# Patient Record
Sex: FEMALE | Race: WHITE | NOT HISPANIC OR LATINO | URBAN - METROPOLITAN AREA
[De-identification: names, ages, dates, MRNs, and addresses within clinical notes are randomized per-mention and may not be internally consistent; named-entity substitution may affect disease eponyms.]

---

## 2017-08-14 ENCOUNTER — OFFICE VISIT - HEALTHEAST (OUTPATIENT)
Dept: GERIATRICS | Facility: CLINIC | Age: 82
End: 2017-08-14

## 2017-08-14 DIAGNOSIS — N17.9 ARF (ACUTE RENAL FAILURE) (H): ICD-10-CM

## 2017-08-14 DIAGNOSIS — E78.5 HYPERLIPIDEMIA: ICD-10-CM

## 2017-08-14 DIAGNOSIS — I10 HYPERTENSION: ICD-10-CM

## 2017-08-14 DIAGNOSIS — M81.0 OSTEOPOROSIS: ICD-10-CM

## 2017-08-16 ENCOUNTER — OFFICE VISIT - HEALTHEAST (OUTPATIENT)
Dept: GERIATRICS | Facility: CLINIC | Age: 82
End: 2017-08-16

## 2017-08-16 DIAGNOSIS — R62.7 FAILURE TO THRIVE IN ADULT: ICD-10-CM

## 2017-08-16 DIAGNOSIS — E78.2 MIXED HYPERLIPIDEMIA: ICD-10-CM

## 2017-08-16 DIAGNOSIS — I95.9 HYPOTENSION: ICD-10-CM

## 2017-08-16 DIAGNOSIS — M81.0 OSTEOPOROSIS: ICD-10-CM

## 2017-08-21 ENCOUNTER — AMBULATORY - HEALTHEAST (OUTPATIENT)
Dept: ADMINISTRATIVE | Facility: CLINIC | Age: 82
End: 2017-08-21

## 2017-08-21 RX ORDER — ATORVASTATIN CALCIUM 40 MG/1
80 TABLET, FILM COATED ORAL DAILY
Status: SHIPPED | COMMUNITY
Start: 2017-04-07

## 2017-08-21 RX ORDER — PANTOPRAZOLE SODIUM 40 MG/1
40 TABLET, DELAYED RELEASE ORAL DAILY
Status: SHIPPED | COMMUNITY
Start: 2017-08-10

## 2017-08-21 RX ORDER — ACETAMINOPHEN 325 MG/1
650 TABLET ORAL EVERY 4 HOURS PRN
Status: SHIPPED | COMMUNITY
Start: 2017-08-10

## 2017-08-21 RX ORDER — ALENDRONATE SODIUM 70 MG/1
1 TABLET ORAL WEEKLY
Status: SHIPPED | COMMUNITY
Start: 2017-04-07

## 2017-08-21 RX ORDER — DOCUSATE SODIUM 100 MG/1
100 CAPSULE, LIQUID FILLED ORAL DAILY PRN
Status: SHIPPED | COMMUNITY
Start: 2017-08-10

## 2017-08-21 RX ORDER — BRIMONIDINE TARTRATE 1 MG/ML
1 SOLUTION/ DROPS OPHTHALMIC EVERY 8 HOURS
Status: SHIPPED | COMMUNITY
Start: 2016-12-14

## 2017-08-21 RX ORDER — DORZOLAMIDE HYDROCHLORIDE AND TIMOLOL MALEATE 20; 5 MG/ML; MG/ML
1 SOLUTION/ DROPS OPHTHALMIC DAILY
Status: SHIPPED | COMMUNITY
Start: 2016-11-29

## 2017-08-22 ENCOUNTER — OFFICE VISIT - HEALTHEAST (OUTPATIENT)
Dept: GERIATRICS | Facility: CLINIC | Age: 82
End: 2017-08-22

## 2017-08-22 DIAGNOSIS — E87.6 POTASSIUM DEFICIENCY: ICD-10-CM

## 2017-08-22 DIAGNOSIS — E61.2 MAGNESIUM DEFICIENCY: ICD-10-CM

## 2017-08-22 DIAGNOSIS — R62.7 FTT (FAILURE TO THRIVE) IN ADULT: ICD-10-CM

## 2017-08-22 DIAGNOSIS — E78.2 MIXED HYPERLIPIDEMIA: ICD-10-CM

## 2017-08-22 DIAGNOSIS — M81.0 OSTEOPOROSIS: ICD-10-CM

## 2017-08-22 DIAGNOSIS — I95.9 HYPOTENSION: ICD-10-CM

## 2017-08-28 ENCOUNTER — OFFICE VISIT - HEALTHEAST (OUTPATIENT)
Dept: GERIATRICS | Facility: CLINIC | Age: 82
End: 2017-08-28

## 2017-08-28 DIAGNOSIS — R62.7 FTT (FAILURE TO THRIVE) IN ADULT: ICD-10-CM

## 2017-08-28 DIAGNOSIS — E87.6 POTASSIUM DEFICIENCY: ICD-10-CM

## 2017-08-28 DIAGNOSIS — E61.2 MAGNESIUM DEFICIENCY: ICD-10-CM

## 2017-08-28 DIAGNOSIS — M81.0 OSTEOPOROSIS: ICD-10-CM

## 2017-08-29 ENCOUNTER — OFFICE VISIT - HEALTHEAST (OUTPATIENT)
Dept: GERIATRICS | Facility: CLINIC | Age: 82
End: 2017-08-29

## 2017-08-29 DIAGNOSIS — E78.2 MIXED HYPERLIPIDEMIA: ICD-10-CM

## 2017-08-29 DIAGNOSIS — I95.9 HYPOTENSION: ICD-10-CM

## 2017-08-29 DIAGNOSIS — E87.6 POTASSIUM DEFICIENCY: ICD-10-CM

## 2017-08-29 DIAGNOSIS — E61.2 MAGNESIUM DEFICIENCY: ICD-10-CM

## 2017-09-08 ENCOUNTER — OFFICE VISIT - HEALTHEAST (OUTPATIENT)
Dept: GERIATRICS | Facility: CLINIC | Age: 82
End: 2017-09-08

## 2017-09-08 DIAGNOSIS — I95.9 HYPOTENSION: ICD-10-CM

## 2017-09-08 DIAGNOSIS — E87.6 POTASSIUM DEFICIENCY: ICD-10-CM

## 2017-09-08 DIAGNOSIS — R62.7 FTT (FAILURE TO THRIVE) IN ADULT: ICD-10-CM

## 2017-09-08 DIAGNOSIS — E61.2 MAGNESIUM DEFICIENCY: ICD-10-CM

## 2017-09-11 ENCOUNTER — OFFICE VISIT - HEALTHEAST (OUTPATIENT)
Dept: GERIATRICS | Facility: CLINIC | Age: 82
End: 2017-09-11

## 2017-09-11 DIAGNOSIS — I95.9 HYPOTENSION: ICD-10-CM

## 2017-09-11 DIAGNOSIS — R62.7 FTT (FAILURE TO THRIVE) IN ADULT: ICD-10-CM

## 2017-09-11 DIAGNOSIS — M81.0 OSTEOPOROSIS: ICD-10-CM

## 2017-09-11 DIAGNOSIS — K59.01 SLOW TRANSIT CONSTIPATION: ICD-10-CM

## 2017-09-20 ENCOUNTER — OFFICE VISIT - HEALTHEAST (OUTPATIENT)
Dept: GERIATRICS | Facility: CLINIC | Age: 82
End: 2017-09-20

## 2017-09-20 DIAGNOSIS — E78.5 HYPERLIPIDEMIA: ICD-10-CM

## 2017-09-20 DIAGNOSIS — N17.9 ARF (ACUTE RENAL FAILURE) (H): ICD-10-CM

## 2017-09-20 DIAGNOSIS — K59.01 SLOW TRANSIT CONSTIPATION: ICD-10-CM

## 2017-09-20 DIAGNOSIS — I95.9 HYPOTENSION: ICD-10-CM

## 2017-09-20 DIAGNOSIS — E87.6 POTASSIUM DEFICIENCY: ICD-10-CM

## 2017-09-20 DIAGNOSIS — E61.2 MAGNESIUM DEFICIENCY: ICD-10-CM

## 2017-09-20 DIAGNOSIS — E78.2 MIXED HYPERLIPIDEMIA: ICD-10-CM

## 2017-09-20 DIAGNOSIS — R62.7 FTT (FAILURE TO THRIVE) IN ADULT: ICD-10-CM

## 2017-09-20 DIAGNOSIS — M81.0 OSTEOPOROSIS: ICD-10-CM

## 2017-09-21 ENCOUNTER — AMBULATORY - HEALTHEAST (OUTPATIENT)
Dept: GERIATRICS | Facility: CLINIC | Age: 82
End: 2017-09-21

## 2020-09-22 ENCOUNTER — RECORDS - HEALTHEAST (OUTPATIENT)
Dept: LAB | Facility: CLINIC | Age: 85
End: 2020-09-22

## 2020-09-23 LAB
ANION GAP SERPL CALCULATED.3IONS-SCNC: 9 MMOL/L (ref 5–18)
BUN SERPL-MCNC: 25 MG/DL (ref 8–28)
CALCIUM SERPL-MCNC: 9.3 MG/DL (ref 8.5–10.5)
CHLORIDE BLD-SCNC: 110 MMOL/L (ref 98–107)
CO2 SERPL-SCNC: 24 MMOL/L (ref 22–31)
CREAT SERPL-MCNC: 1.26 MG/DL (ref 0.6–1.1)
GFR SERPL CREATININE-BSD FRML MDRD: 40 ML/MIN/1.73M2
GLUCOSE BLD-MCNC: 154 MG/DL (ref 70–125)
MAGNESIUM SERPL-MCNC: 2 MG/DL (ref 1.8–2.6)
POTASSIUM BLD-SCNC: 3.8 MMOL/L (ref 3.5–5)
SODIUM SERPL-SCNC: 143 MMOL/L (ref 136–145)

## 2021-05-31 VITALS — WEIGHT: 125.4 LBS

## 2021-05-31 VITALS — WEIGHT: 125 LBS

## 2021-05-31 VITALS — WEIGHT: 125.6 LBS

## 2021-05-31 VITALS — WEIGHT: 125.8 LBS

## 2021-06-12 NOTE — PROGRESS NOTES
[facility]      Facility:    ANSWER ROOMING ACTIVITY QUESTION  Code Status: FULL CODE      Chief Complaint/Reason for Visit:  No chief complaint on file.      HPI:   Kayla is a 82 y.o. female who is currently admitted to the TCU as a transfer from [facility]. She has a past medical history of a cerebellar disorder, hypertension, hyperlipidemia, and osteoporosis who lives by herself in her own home in [place].  Apparently she had been not been doing well due to an underlying history of balance issues.  She had frequent falls as well.  She was admitted to the hospital with increased dizziness and was also noted to have renal insufficiency with a creatinine of 2.06 with a baseline of 1.5.  She is felt to be dehydrated and fluid resuscitated, which improved her kidney function.  She was also noted to be hypotensive so her losartan and her chlorthalidone were discontinued.  It was felt that her declining oral intake and poor self-care ability contributed to her low blood pressures hence she will need alternative arrangements for long-term housing.  Family is currently looking for placement preferably in assisted living in the meantime she will be participating in therapy to maintain strength and cognitive testing.    TCU:  Today I find an 82-year-old female lying in bed with limited complaints.  She denied any issues, is participating in therapy, and just waiting for long-term placement.  As well no issues today.  Potassium and magnesium stable after supplementation.  Requested wheelchair previously (8/28). BIMS 15/15.    Patient denies pain, headache, chest pain, numbness or tingling, shortest of breath, eating or swallowing concerns, nausea or vomiting, diarrhea or bowel abnormalities, or no new integumentary concerns today.    Past Medical History:  Past Medical History:   Diagnosis Date     GARLAND (acute kidney injury)      Cerebellar dysfunction      Esophageal reflux       Glaucoma      HLD (hyperlipidemia)      HTN (hypertension)      Osteoporosis            Surgical History:  Past Surgical History:   Procedure Laterality Date     CATARACT EXTRACTION Right 02/11/2013     EYE SURGERY Bilateral     laser surgery of eye; right 5/15/12, left 9/12/12     TONSILLECTOMY       VAGINAL DELIVERY      x2       Family History:   Family History   Problem Relation Age of Onset     Heart disease Mother      MI, CABG     Hypertension Mother      Arthritis Mother      Heart disease Father      pacemaker     Cancer Father      Hypertension Sister      Prostate cancer Brother      Breast cancer Sister      Hypertension Brother        Social History:    Social History     Social History     Marital status:      Spouse name: N/A     Number of children: N/A     Years of education: N/A     Social History Main Topics     Smoking status: Never Smoker     Smokeless tobacco: Never Used     Alcohol use No     Drug use: Not on file     Sexual activity: Not on file     Other Topics Concern     Not on file     Social History Narrative     No narrative on file          Review of Systems   Constitutional: Negative for activity change, appetite change and fatigue.        No acute concerns   HENT: Negative for congestion and facial swelling.    Eyes: Negative for photophobia and visual disturbance.   Respiratory: Negative for apnea, shortness of breath and wheezing.    Cardiovascular: Negative for chest pain.   Gastrointestinal: Negative for abdominal distention, abdominal pain, blood in stool and constipation.   Endocrine: Negative.    Genitourinary: Negative for dysuria and frequency.   Skin: Negative.    Allergic/Immunologic: Negative.    Neurological: Positive for dizziness. Negative for tremors, speech difficulty, weakness and headaches.        Chronic vertigo   Hematological: Negative.    Psychiatric/Behavioral: Negative for agitation and confusion.       Vitals:    09/10/17 1449   BP: 151/67   Pulse: 76    Resp: 20   Temp: 97.5  F (36.4  C)   SpO2: 94%   Weight: 125 lb 6.4 oz (56.9 kg)       Physical Exam   Constitutional: She is oriented to person, place, and time. No distress.   No acute concerns   HENT:   Head: Normocephalic and atraumatic.   Mouth/Throat: Oropharynx is clear and moist.   Eyes: Right eye exhibits no discharge. Left eye exhibits no discharge.   Neck: Normal range of motion.   Cardiovascular: Normal rate and regular rhythm.  Exam reveals no gallop and no friction rub.    No murmur heard.  Pulmonary/Chest: Effort normal and breath sounds normal. She has no rales.   clear   Abdominal: Soft. Bowel sounds are normal. She exhibits no distension. There is no tenderness.   Genitourinary:   Genitourinary Comments: deferred   Musculoskeletal: She exhibits no edema, tenderness or deformity.   Neurological: She is oriented to person, place, and time.   Chronic vertigo, dizziness   Skin: Skin is warm and dry. She is not diaphoretic.   intact   Psychiatric: She has a normal mood and affect.   No depression or anxiety       Medication List:  Current Outpatient Prescriptions   Medication Sig     acetaminophen (TYLENOL) 325 MG tablet Take 650 mg by mouth every 4 (four) hours as needed.     alendronate (FOSAMAX) 70 MG tablet Take 1 tablet by mouth once a week. Every Thu     atorvastatin (LIPITOR) 40 MG tablet Take 80 mg by mouth daily.     bimatoprost (LUMIGAN) 0.01 % Drop Administer 1 drop to both eyes daily.     brimonidine (ALPHAGAN P) 0.1 % Drop Administer 1 drop to both eyes every 8 (eight) hours.     docusate sodium (COLACE) 100 MG capsule Take 100 mg by mouth daily as needed.     dorzolamide-timolol (COSOPT) 22.3-6.8 mg/mL ophthalmic solution Administer 1 drop to both eyes daily.     magnesium oxide 250 mg Tab Take 250 mg by mouth daily.     pantoprazole (PROTONIX) 40 MG tablet Take 40 mg by mouth daily.     potassium chloride (KLOR-CON) 10 MEQ CR tablet Take 10 mEq by mouth daily.       Labs:  No results  found for this or any previous visit (from the past 168 hour(s)).    Assessment:    ICD-10-CM    1. Hypotension I95.9    2. Potassium deficiency E87.6    3. Magnesium deficiency E61.2    4. FTT (failure to thrive) in adult R62.7      Plan:  1. Monitor BPs, 110-150s/60-80s  2. Last K 4.2  3. Last mag 2.0  4. Wt stable at 125lb    The care plan has been reviewed and all orders signed. Changes to care plan, if any, as noted. Otherwise, continue care plan of care.      Electronically signed by: Pedro Damon NP   Cold/Sinus

## 2021-06-12 NOTE — PROGRESS NOTES
[facility]      Code Status:  FULL CODE  Visit Type: H & P     Facility:  [facility] [[id number]]           History of Present Illness: Kayla Tanner is a 82 y.o. female who is currently admitted to the TCU as a transfer from [facility].  This is a 82-year-old with history of some cerebellar disorder along with hypertension hyperlipidemia who lives in her own home in [place].  She has not been doing very well with due to the underlying history of balance issues she falls frequently there.  She was admitted to the hospital with increasing dizziness and was also noted to have renal insufficiency with an elevated creatinine of 2.06 with a baseline of 1.5.  She was felt to be dehydrated and given IV fluids with improvement in her kidney function.  She was also noted to be hypotensive and her losartan and chlorthalidone was discontinued and her blood pressures did rebound it was felt that due to declining oral intake poor self-cares her blood pressures were low hence she was becoming more symptomatic and it was recommended that she look for an alternative arrangement  Patient agrees with above game plan and she is just awaiting family to look for an alternative placement preferably in assisted living in the meantime she is here for therapy and strengthening as per her    Past Medical History:   Diagnosis Date     GARLAND (acute kidney injury)      Cerebellar dysfunction      Esophageal reflux      Glaucoma      HLD (hyperlipidemia)      HTN (hypertension)      Osteoporosis      Past Surgical History:   Procedure Laterality Date     CATARACT EXTRACTION Right 02/11/2013     EYE SURGERY Bilateral     laser surgery of eye; right 5/15/12, left 9/12/12     TONSILLECTOMY       VAGINAL DELIVERY      x2     Family History   Problem Relation Age of Onset     Heart disease Mother      MI, CABG     Hypertension Mother      Arthritis Mother      Heart disease Father       pacemaker     Cancer Father      Hypertension Sister      Prostate cancer Brother      Breast cancer Sister      Hypertension Brother      Social History     Social History     Marital status:      Spouse name: N/A     Number of children: N/A     Years of education: N/A     Occupational History     Not on file.     Social History Main Topics     Smoking status: Never Smoker     Smokeless tobacco: Never Used     Alcohol use No     Drug use: Not on file     Sexual activity: Not on file     Other Topics Concern     Not on file     Social History Narrative     No narrative on file     Current Outpatient Prescriptions   Medication Sig Dispense Refill     acetaminophen (TYLENOL) 325 MG tablet Take 650 mg by mouth every 4 (four) hours as needed.       alendronate (FOSAMAX) 70 MG tablet Take 1 tablet by mouth once a week. Every Thu       atorvastatin (LIPITOR) 40 MG tablet Take 80 mg by mouth daily.       bimatoprost (LUMIGAN) 0.01 % Drop Administer 1 drop to both eyes daily.       brimonidine (ALPHAGAN P) 0.1 % Drop Administer 1 drop to both eyes every 8 (eight) hours.       docusate sodium (COLACE) 100 MG capsule Take 100 mg by mouth daily as needed.       dorzolamide-timolol (COSOPT) 22.3-6.8 mg/mL ophthalmic solution Administer 1 drop to both eyes daily.       pantoprazole (PROTONIX) 40 MG tablet Take 40 mg by mouth daily.       No current facility-administered medications for this visit.      Allergies   Allergen Reactions     Amlodipine Swelling         Review of Systems:    Constitutional: Negative.  Negative for fever, chills, activity change, appetite change and fatigue.   HENT: Negative for congestion and facial swelling.    Eyes: Negative for photophobia, redness and visual disturbance.   Respiratory: Negative for cough and chest tightness.    Cardiovascular: Negative for chest pain, palpitations and leg swelling.   Gastrointestinal: Negative for nausea, diarrhea, constipation, blood in stool and abdominal  distention.   Genitourinary: Negative.    Musculoskeletal: Negative.    Skin: Negative.    Neurological: Negative for dizziness, tremors, syncope, weakness, light-headedness and headaches.   Hematological: Does not bruise/bleed easily.   Psychiatric/Behavioral: Negative.      Vitals:    08/14/17 1236   BP: 108/61   Pulse: 75   Resp: 18   Temp: 98  F (36.7  C)       Physical Exam:    GENERAL: no acute distress. Cooperative in conversation.   HEENT: pupils are equal, round and reactive. Oral mucosa is moist and intact.  RESP:Chest symmetric. Regular respiratory rate. No stridor.  CVS: S1S2  ABD: Nondistended, soft.  EXTREMITIES: No lower extremity edema.   NEURO: non focal. Alert and oriented x3.   PSYCH: within normal limits. No depression or anxiety.  SKIN: warm dry intact       Labs:                    Back to top of Results      BASIC METABOLIC PANEL (08/09/2017 7:48 AM)  Only the most recent of 3 results within the time period is included.    BASIC METABOLIC PANEL (08/09/2017 7:48 AM)   Component Value Ref Range   SODIUM 144 135 - 145 mmol/L   POTASSIUM 3.8 3.5 - 5.0 mmol/L   CHLORIDE 118 (H) 98 - 110 mmol/L   CO2,TOTAL 17 (L) 21 - 31 mmol/L   ANION GAP 9 5 - 18    GLUCOSE 141 (H) 65 - 100 mg/dL   CALCIUM 8.2 (L) 8.5 - 10.5 mg/dL   BUN 26 (H) 8 - 25 mg/dL   CREATININE 1.28 (H) 0.57 - 1.11 mg/dL   BUN/CREAT RATIO  20 10 - 20    GFR if  48 (L) >60 ml/min/1.73m2   GFR if not African American 40 (L) >60 ml/min/1.73m2     BASIC METABOLIC PANEL (08/09/2017 7:48 AM)   Specimen Performing Laboratory   [facility]    SENDOUT INTERNAL ZIP [id number] [address] [place] [address]     Back to top of Results      XR CHEST 2 VIEWS PA AND LATERAL (08/07/2017 8:33 PM)  XR CHEST 2 VIEWS PA AND LATERAL (08/07/2017 8:33 PM)   Narrative   XR CHEST 2 VIEWS PA AND LATERAL    8/7/2017 8:33 PM        INDICATION: Weakness.    COMPARISON: None.        FINDINGS: Negative chest. Mild  aortic atherosclerosis.       XR CHEST 2 VIEWS PA AND LATERAL (08/07/2017 8:33 PM)   Procedure Note   Interface, Powerscribe - 08/07/2017 8:59 PM CDT    XR CHEST 2 VIEWS PA AND LATERAL  8/7/2017 8:33 PM    INDICATION: Weakness.  COMPARISON: None.    FINDINGS: Negative chest. Mild aortic atherosclerosis.     Back to top of Results      EXTRA TUBE BLUE (08/07/2017 8:19 PM)  EXTRA TUBE BLUE (08/07/2017 8:19 PM)   Specimen Performing Laboratory   [facility]    SENDOUT INTERNAL ZIP [id number]    [address] [place] [address]     Back to top of Results      NUCLEATED RED BLOOD CELLS AS PERCENT OF BLOOD LEUKOCYTES (08/07/2017 8:19 PM)  NUCLEATED RED BLOOD CELLS AS PERCENT OF BLOOD LEUKOCYTES (08/07/2017 8:19 PM)   Component Value Ref Range   NRBC  0.0 %   ABS NRBC 0.0 thou /cu mm     NUCLEATED RED BLOOD CELLS AS PERCENT OF BLOOD LEUKOCYTES (08/07/2017 8:19 PM)   Specimen Performing Laboratory   [facility]    SENDOUT INTERNAL ZIP [id number]    [address] [place] [address]       NUCLEATED RED BLOOD CELLS AS PERCENT OF BLOOD LEUKOCYTES (08/07/2017 8:19 PM)   Narrative                Back to top of Results      CBC (08/07/2017 8:19 PM)  CBC (08/07/2017 8:19 PM)   Component Value Ref Range   WHITE BLOOD COUNT  10.4 4.5 - 11.0 thou/cu mm   RED BLOOD COUNT  3.82 (L) 4.00 - 5.20 mil/cu mm   HEMOGLOBIN  11.8 (L) 12.0 - 16.0 g/dL   HEMATOCRIT  35.9 33.0 - 51.0 %   MCV  94 80 - 100 fL   MCH  30.9 26.0 - 34.0 pg   MCHC  32.9 32.0 - 36.0 g/dL   RDW  12.6 11.5 - 15.5 %   PLATELET COUNT  241 140 - 440 thou/cu mm   MPV  10.6 6.5 - 11.0 fL     CBC (08/07/2017 8:19 PM)   Specimen Performing Laboratory   [facility]    SENDOUT INTERNAL ZIP [id number]    [address] [place] [address]     Back to top of Results      HEPATIC FUNCTION PANEL (08/07/2017 8:19 PM)  HEPATIC FUNCTION PANEL (08/07/2017 8:19 PM)   Component Value Ref Range   ALBUMIN  4.0 3.2 - 4.6 g/dL   PROTEIN,TOTAL 7.4 6.0 - 8.0 g/dL   GLOBULIN  3.4 2.0 - 3.7 g/dL   A/G RATIO 1.2 1.0 - 2.0    BILIRUBIN,TOTAL 0.5 0.2 - 1.2 mg/dL   BILIRUBIN,DIRECT 0.2 0.1 - 0.5 mg/dL   BILIRUBIN,INDIRECT  0.3 0.2 - 0.8 mg/dL   ALK PHOSPHATASE 83 50 - 136 IU/L   ALT (SGPT) 16 8 - 45 IU/L   AST (SGOT) 21 2 - 40 IU/L     HEPATIC FUNCTION PANEL (08/07/2017 8:19 PM)   Specimen Performing Laboratory   [facility]    SENDOUT INTERNAL ZIP [id number]    [address] [place] [address]             Assessment/Plan:    Acute renal insufficiency felt to be secondary to dehydration with improvement in creatinine post fluid administration  Hypotension with hypertension taken off the losartan and HCTZ blood pressures so far have been low continue to monitor clinically  Cerebellar dysfunction with resultant loss of balance with frequent falls  Failure to thrive with poor self-cares family is looking at alternative placement decisions for her  GERD-she is on Protonix daily  Osteoporosis continue with her Fosamax  Hyperlipidemia continue with her Lipitor  Debilitation with history of frequent falls she is here for PT OT and rehab plan is to discharge to an alternative facility upon discharge  Total time spent was 45 minutes, more than half of it was in face-to-face counseling regarding disease state, treatment, side effects, documentation, review of clinical data and coordination of care  Electronically signed by: RHINA Montano  This progress note was completed using Dragon software and there may be grammatical errors.

## 2021-06-12 NOTE — PROGRESS NOTES
[facility]      Facility:    [facility] [[id number]]  Code Status: FULL CODE      Chief Complaint/Reason for Visit:  Chief Complaint   Patient presents with     Review Of Multiple Medical Conditions       HPI:   Kayla is a 82 y.o. female who is currently admitted to the TCU as a transfer from [facility]. She has a past medical history of a cerebellar disorder, hypertension, hyperlipidemia, and osteoporosis who lives by herself in her own home in [place].  Apparently she had been not been doing well due to an underlying history of balance issues.  She had frequent falls as well.  She was admitted to the hospital with increased dizziness and was also noted to have renal insufficiency with a creatinine of 2.06 with a baseline of 1.5.  She is felt to be dehydrated and fluid resuscitated, which improved her kidney function.  She was also noted to be hypotensive so her losartan and her chlorthalidone were discontinued.  It was felt that her declining oral intake and poor self-care ability contributed to her low blood pressures hence she will need alternative arrangements for long-term housing.  Family is currently looking for placement preferably in assisted living in the meantime she will be participating in therapy to maintain strength and cognitive testing.    TCU:  Today I find an 82-year-old female lying in bed with limited complaints.  She denied any issues, is participating in therapy, and just waiting for long-term placement.  As well no issues today.  Found to have hypo-kalemia with a potassium of 3.4, will supplement with 10 mEq daily.  Also found to have hypo-magnesium with a level of 1.7, will supplement with 250 mg p.o. Daily. Currently waiting for level today.  Requested wheelchair yesterday (8/28).    Patient denies pain, headache, chest pain, numbness or tingling, shortest of breath, eating or swallowing concerns, nausea or vomiting, diarrhea  or bowel abnormalities, or no new integumentary concerns today.    Past Medical History:  Past Medical History:   Diagnosis Date     GARLAND (acute kidney injury)      Cerebellar dysfunction      Esophageal reflux      Glaucoma      HLD (hyperlipidemia)      HTN (hypertension)      Osteoporosis            Surgical History:  Past Surgical History:   Procedure Laterality Date     CATARACT EXTRACTION Right 02/11/2013     EYE SURGERY Bilateral     laser surgery of eye; right 5/15/12, left 9/12/12     TONSILLECTOMY       VAGINAL DELIVERY      x2       Family History:   Family History   Problem Relation Age of Onset     Heart disease Mother      MI, CABG     Hypertension Mother      Arthritis Mother      Heart disease Father      pacemaker     Cancer Father      Hypertension Sister      Prostate cancer Brother      Breast cancer Sister      Hypertension Brother        Social History:    Social History     Social History     Marital status:      Spouse name: N/A     Number of children: N/A     Years of education: N/A     Social History Main Topics     Smoking status: Never Smoker     Smokeless tobacco: Never Used     Alcohol use No     Drug use: Not on file     Sexual activity: Not on file     Other Topics Concern     Not on file     Social History Narrative     No narrative on file          Review of Systems   Constitutional: Negative for activity change, appetite change and fatigue.        No acute concerns   HENT: Negative for congestion and facial swelling.    Eyes: Negative for photophobia and visual disturbance.   Respiratory: Negative for apnea, shortness of breath and wheezing.    Cardiovascular: Negative for chest pain.   Gastrointestinal: Negative for abdominal distention, abdominal pain, blood in stool and constipation.   Endocrine: Negative.    Genitourinary: Negative for dysuria and frequency.   Skin: Negative.    Allergic/Immunologic: Negative.    Neurological: Positive for dizziness. Negative for tremors,  speech difficulty, weakness and headaches.        Chronic vertigo   Hematological: Negative.    Psychiatric/Behavioral: Negative for agitation and confusion.       Vitals:    08/29/17 1119   BP: 143/71   Pulse: 77   Resp: 18   Temp: 97.9  F (36.6  C)   SpO2: 96%   Weight: 125 lb 6.4 oz (56.9 kg)       Physical Exam   Constitutional: She is oriented to person, place, and time. No distress.   No acute concerns   HENT:   Head: Normocephalic and atraumatic.   Mouth/Throat: Oropharynx is clear and moist.   Eyes: Right eye exhibits no discharge. Left eye exhibits no discharge.   Neck: Normal range of motion.   Cardiovascular: Normal rate and regular rhythm.  Exam reveals no gallop and no friction rub.    No murmur heard.  Pulmonary/Chest: Effort normal and breath sounds normal. She has no rales.   clear   Abdominal: Soft. Bowel sounds are normal. She exhibits no distension. There is no tenderness.   Genitourinary:   Genitourinary Comments: deferred   Musculoskeletal: She exhibits no edema, tenderness or deformity.   Neurological: She is oriented to person, place, and time.   Chronic vertigo, dizziness   Skin: Skin is warm and dry. She is not diaphoretic.   intact   Psychiatric: She has a normal mood and affect.   No depression or anxiety       Medication List:  Current Outpatient Prescriptions   Medication Sig     acetaminophen (TYLENOL) 325 MG tablet Take 650 mg by mouth every 4 (four) hours as needed.     alendronate (FOSAMAX) 70 MG tablet Take 1 tablet by mouth once a week. Every Thu     atorvastatin (LIPITOR) 40 MG tablet Take 80 mg by mouth daily.     bimatoprost (LUMIGAN) 0.01 % Drop Administer 1 drop to both eyes daily.     brimonidine (ALPHAGAN P) 0.1 % Drop Administer 1 drop to both eyes every 8 (eight) hours.     docusate sodium (COLACE) 100 MG capsule Take 100 mg by mouth daily as needed.     dorzolamide-timolol (COSOPT) 22.3-6.8 mg/mL ophthalmic solution Administer 1 drop to both eyes daily.     magnesium  oxide 250 mg Tab Take 250 mg by mouth daily.     pantoprazole (PROTONIX) 40 MG tablet Take 40 mg by mouth daily.     potassium chloride (KLOR-CON) 10 MEQ CR tablet Take 10 mEq by mouth daily.       Labs:  No results found for this or any previous visit (from the past 168 hour(s)).    Assessment:    ICD-10-CM    1. Hypotension I95.9    2. Potassium deficiency E87.6    3. Magnesium deficiency E61.2    4. Mixed hyperlipidemia E78.2      Plan:  1. Monitor BPs, 110-140s/60-70s  2. Pending potassium  3. Pending magnesium  4. No change, statin    The care plan has been reviewed and all orders signed. Changes to care plan, if any, as noted. Otherwise, continue care plan of care.      Electronically signed by: Pedro Damon NP

## 2021-06-12 NOTE — PROGRESS NOTES
[facility]      Facility:    [facility] [[id number]]  Code Status: FULL CODE      Chief Complaint/Reason for Visit:  Chief Complaint   Patient presents with     Review Of Multiple Medical Conditions       HPI:   Kayla is a 82 y.o. female who is currently admitted to the TCU as a transfer from [facility]. She has a past medical history of a cerebellar disorder, hypertension, hyperlipidemia, and osteoporosis who lives by herself in her own home in [place].  Apparently she had been not been doing well due to an underlying history of balance issues.  She had frequent falls as well.  She was admitted to the hospital with increased dizziness and was also noted to have renal insufficiency with a creatinine of 2.06 with a baseline of 1.5.  She is felt to be dehydrated and fluid resuscitated, which improved her kidney function.  She was also noted to be hypotensive so her losartan and her chlorthalidone were discontinued.  It was felt that her declining oral intake and poor self-care ability contributed to her low blood pressures hence she will need alternative arrangements for long-term housing.  Family is currently looking for placement preferably in assisted living in the meantime she will be participating in therapy to maintain strength and cognitive testing.    TCU:  Today I find an 82-year-old female lying in bed with limited complaints.  She denied any issues, is participating in therapy, and just waiting for long-term placement.  As well no issues today.  Found to have hypo-kalemia with a potassium of 3.4, will supplement with 10 milk limits daily.  Also found to have hypo-magnesium with a level of 1.7, will supplement with 250 mg p.o. Daily.    Patient denies pain, headache, chest pain, numbness or tingling, shortest of breath, eating or swallowing concerns, nausea or vomiting, diarrhea or bowel abnormalities, or no new integumentary concerns  today.    Past Medical History:  Past Medical History:   Diagnosis Date     GARLAND (acute kidney injury)      Cerebellar dysfunction      Esophageal reflux      Glaucoma      HLD (hyperlipidemia)      HTN (hypertension)      Osteoporosis            Surgical History:  Past Surgical History:   Procedure Laterality Date     CATARACT EXTRACTION Right 02/11/2013     EYE SURGERY Bilateral     laser surgery of eye; right 5/15/12, left 9/12/12     TONSILLECTOMY       VAGINAL DELIVERY      x2       Family History:   Family History   Problem Relation Age of Onset     Heart disease Mother      MI, CABG     Hypertension Mother      Arthritis Mother      Heart disease Father      pacemaker     Cancer Father      Hypertension Sister      Prostate cancer Brother      Breast cancer Sister      Hypertension Brother        Social History:    Social History     Social History     Marital status:      Spouse name: N/A     Number of children: N/A     Years of education: N/A     Social History Main Topics     Smoking status: Never Smoker     Smokeless tobacco: Never Used     Alcohol use No     Drug use: Not on file     Sexual activity: Not on file     Other Topics Concern     Not on file     Social History Narrative     No narrative on file          Review of Systems   Constitutional: Negative for activity change, appetite change and fatigue.        No acute concerns   HENT: Negative for congestion and facial swelling.    Eyes: Negative for photophobia and visual disturbance.   Respiratory: Negative for apnea, shortness of breath and wheezing.    Cardiovascular: Negative for chest pain.   Gastrointestinal: Negative for abdominal distention, abdominal pain, blood in stool and constipation.   Endocrine: Negative.    Genitourinary: Negative for dysuria and frequency.   Skin: Negative.    Allergic/Immunologic: Negative.    Neurological: Positive for dizziness. Negative for tremors, speech difficulty, weakness and headaches.         Chronic vertigo   Hematological: Negative.    Psychiatric/Behavioral: Negative for agitation and confusion.       Vitals:    08/22/17 1200   BP: 126/69   Pulse: 87   Resp: 18   Temp: 98.5  F (36.9  C)   SpO2: 96%   Weight: 125 lb (56.7 kg)       Physical Exam   Constitutional: She is oriented to person, place, and time. No distress.   No acute concerns   HENT:   Head: Normocephalic and atraumatic.   Mouth/Throat: Oropharynx is clear and moist.   Eyes: Right eye exhibits no discharge. Left eye exhibits no discharge.   Neck: Normal range of motion.   Cardiovascular: Normal rate and regular rhythm.  Exam reveals no gallop and no friction rub.    No murmur heard.  Pulmonary/Chest: Effort normal and breath sounds normal. She has no rales.   clear   Abdominal: Soft. Bowel sounds are normal. She exhibits no distension. There is no tenderness.   Genitourinary:   Genitourinary Comments: deferred   Musculoskeletal: She exhibits no edema, tenderness or deformity.   Neurological: She is oriented to person, place, and time.   Chronic vertigo, dizziness   Skin: Skin is warm and dry. She is not diaphoretic.   intact   Psychiatric: She has a normal mood and affect.   No depression or anxiety       Medication List:  Current Outpatient Prescriptions   Medication Sig     magnesium oxide 250 mg Tab Take 250 mg by mouth daily.     potassium chloride (KLOR-CON) 10 MEQ CR tablet Take 10 mEq by mouth daily.     acetaminophen (TYLENOL) 325 MG tablet Take 650 mg by mouth every 4 (four) hours as needed.     alendronate (FOSAMAX) 70 MG tablet Take 1 tablet by mouth once a week. Every Thu     atorvastatin (LIPITOR) 40 MG tablet Take 80 mg by mouth daily.     bimatoprost (LUMIGAN) 0.01 % Drop Administer 1 drop to both eyes daily.     brimonidine (ALPHAGAN P) 0.1 % Drop Administer 1 drop to both eyes every 8 (eight) hours.     docusate sodium (COLACE) 100 MG capsule Take 100 mg by mouth daily as needed.     dorzolamide-timolol (COSOPT) 22.3-6.8  mg/mL ophthalmic solution Administer 1 drop to both eyes daily.     pantoprazole (PROTONIX) 40 MG tablet Take 40 mg by mouth daily.       Labs:  Recent Results (from the past 168 hour(s))   Basic Metabolic Panel   Result Value Ref Range    Sodium 142 136 - 145 mmol/L    Potassium 3.4 (L) 3.5 - 5.0 mmol/L    Chloride 105 98 - 107 mmol/L    CO2 25 22 - 31 mmol/L    Anion Gap, Calculation 12 5 - 18 mmol/L    Glucose 136 (H) 70 - 125 mg/dL    Calcium 9.1 8.5 - 10.5 mg/dL    BUN 17 8 - 28 mg/dL    Creatinine 0.97 0.60 - 1.10 mg/dL    GFR MDRD Af Amer >60 >60 mL/min/1.73m2    GFR MDRD Non Af Amer 55 (L) >60 mL/min/1.73m2   Magnesium   Result Value Ref Range    Magnesium 1.7 (L) 1.8 - 2.6 mg/dL   Prealbumin   Result Value Ref Range    Prealbumin 33.8 19.0 - 38.0 mg/dL   HM2(CBC w/o Differential)   Result Value Ref Range    WBC 9.7 4.0 - 11.0 thou/uL    RBC 3.28 (L) 3.80 - 5.40 mill/uL    Hemoglobin 10.3 (L) 12.0 - 16.0 g/dL    Hematocrit 32.3 (L) 35.0 - 47.0 %    MCV 99 80 - 100 fL    MCH 31.4 27.0 - 34.0 pg    MCHC 31.9 (L) 32.0 - 36.0 g/dL    RDW 13.2 11.0 - 14.5 %    Platelets 229 140 - 440 thou/uL    MPV 10.2 8.5 - 12.5 fL     Assessment:    ICD-10-CM    1. Hypotension I95.9    2. FTT (failure to thrive) in adult R62.7    3. Mixed hyperlipidemia E78.2    4. Osteoporosis M81.0    5. Potassium deficiency E87.6    6. Magnesium deficiency E61.2      Plan:  1. Monitor BPs, 110-120s/60-70s  2. Monitor weights, 125lbs  3. Continue statin  4. Continue Fosamax  5. Start potassium 10mEq daily, check BMP and mag level in 1 wk  6. Start magnesium 250mg    The care plan has been reviewed and all orders signed. Changes to care plan, if any, as noted. Otherwise, continue care plan of care.      Electronically signed by: Pedro Damon NP

## 2021-06-12 NOTE — PROGRESS NOTES
[facility]      Facility:    [facility] [[id number]]  Code Status: FULL CODE      Chief Complaint/Reason for Visit:  Chief Complaint   Patient presents with     Review Of Multiple Medical Conditions       HPI:   Kayla is a 82 y.o. female who is currently admitted to the TCU as a transfer from [facility]. She has a past medical history of a cerebellar disorder, hypertension, hyperlipidemia, and osteoporosis who lives by herself in her own home in [place].  Apparently she had been not been doing well due to an underlying history of balance issues.  She had frequent falls as well.  She was admitted to the hospital with increased dizziness and was also noted to have renal insufficiency with a creatinine of 2.06 with a baseline of 1.5.  She is felt to be dehydrated and fluid resuscitated, which improved her kidney function.  She was also noted to be hypotensive so her losartan and her chlorthalidone were discontinued.  It was felt that her declining oral intake and poor self-care ability contributed to her low blood pressures hence she will need alternative arrangements for long-term housing.  Family is currently looking for placement preferably in assisted living in the meantime she will be participating in therapy to maintain strength and cognitive testing.    TCU:  Today I find an 82-year-old female lying in bed with limited complaints.  She denied any issues, is participating in therapy, and just waiting for long-term placement..    Patient denies pain, headache, chest pain, numbness or tingling, shortest of breath, eating or swallowing concerns, nausea or vomiting, diarrhea or bowel abnormalities, or no new integumentary concerns today.    Past Medical History:  No past medical history on file.        Surgical History:  No past surgical history on file.    Family History:   No family history on file.    Social History:    Social History     Social  History     Marital status:      Spouse name: N/A     Number of children: N/A     Years of education: N/A     Social History Main Topics     Smoking status: Not on file     Smokeless tobacco: Not on file     Alcohol use Not on file     Drug use: Not on file     Sexual activity: Not on file     Other Topics Concern     Not on file     Social History Narrative          Review of Systems   Constitutional: Negative for activity change, appetite change and fatigue.        No acute concerns   HENT: Negative for congestion and facial swelling.    Eyes: Negative for photophobia and visual disturbance.   Respiratory: Negative for apnea, shortness of breath and wheezing.    Cardiovascular: Negative for chest pain.   Gastrointestinal: Negative for abdominal distention, abdominal pain, blood in stool and constipation.   Endocrine: Negative.    Genitourinary: Negative for dysuria and frequency.   Skin: Negative.    Allergic/Immunologic: Negative.    Neurological: Positive for dizziness. Negative for tremors, speech difficulty, weakness and headaches.        Chronic vertigo   Hematological: Negative.    Psychiatric/Behavioral: Negative for agitation and confusion.       Vitals:    08/16/17 2022   BP: 109/57   Pulse: 89   Resp: 16   Temp: 97.8  F (36.6  C)   SpO2: 94%   Weight: 125 lb 12.8 oz (57.1 kg)       Physical Exam   Constitutional: She is oriented to person, place, and time. No distress.   No acute concerns   HENT:   Head: Normocephalic and atraumatic.   Mouth/Throat: Oropharynx is clear and moist.   Eyes: Right eye exhibits no discharge. Left eye exhibits no discharge.   Neck: Normal range of motion.   Cardiovascular: Normal rate and regular rhythm.  Exam reveals no gallop and no friction rub.    No murmur heard.  Pulmonary/Chest: Effort normal and breath sounds normal. She has no rales.   clear   Abdominal: Soft. Bowel sounds are normal. She exhibits no distension. There is no tenderness.   Genitourinary:    Genitourinary Comments: deferred   Musculoskeletal: She exhibits no edema, tenderness or deformity.   Neurological: She is oriented to person, place, and time.   Chronic vertigo, dizziness   Skin: Skin is warm and dry. She is not diaphoretic.   intact   Psychiatric: She has a normal mood and affect.   No depression or anxiety       Medication List:  No current outpatient prescriptions on file.       Labs:  No results found for this or any previous visit (from the past 168 hour(s)).      Assessment:    ICD-10-CM    1. Hypotension I95.9    2. Failure to thrive in adult R62.7    3. Mixed hyperlipidemia E78.2    4. Osteoporosis M81.0        Plan:  1. Monitor BPs, stable  2. Monitor weights  3. Continue statin  4. Continue Fosamax    The care plan has been reviewed and all orders signed. Changes to care plan, if any, as noted. Otherwise, continue care plan of care.      Electronically signed by: Pedro Damon NP

## 2021-06-12 NOTE — PROGRESS NOTES
[facility]      Code Status:  FULL CODE  Visit Type: Review Of Multiple Medical Conditions     Facility:  [facility] [[id number]]           History of Present Illness: Kayla Tanner is a 82 y.o. female who is currently admitted to the TCU as a transfer from [facility].  This is a 82-year-old with history of some cerebellar disorder along with hypertension hyperlipidemia who lives in her own home in [place].  She has not been doing very well with due to the underlying history of balance issues she falls frequently there.  She was admitted to the hospital with increasing dizziness and was also noted to have renal insufficiency with an elevated creatinine of 2.06 with a baseline of 1.5.  She was felt to be dehydrated and given IV fluids with improvement in her kidney function.  She was also noted to be hypotensive and her losartan and chlorthalidone was discontinued and her blood pressures did rebound it was felt that due to declining oral intake poor self-cares her blood pressures were low hence she was becoming more symptomatic and it was recommended that she look for an alternative arrangement  Patient agrees with above game plan and she is just awaiting family to look for an alternative placement preferably in assisted living in the meantime she is here for therapy and strengthening as per her    Past Medical History:   Diagnosis Date     GARLAND (acute kidney injury)      Cerebellar dysfunction      Esophageal reflux      Glaucoma      HLD (hyperlipidemia)      HTN (hypertension)      Osteoporosis      Past Surgical History:   Procedure Laterality Date     CATARACT EXTRACTION Right 02/11/2013     EYE SURGERY Bilateral     laser surgery of eye; right 5/15/12, left 9/12/12     TONSILLECTOMY       VAGINAL DELIVERY      x2     Family History   Problem Relation Age of Onset     Heart disease Mother      MI, CABG     Hypertension Mother      Arthritis Mother       Heart disease Father      pacemaker     Cancer Father      Hypertension Sister      Prostate cancer Brother      Breast cancer Sister      Hypertension Brother      Social History     Social History     Marital status:      Spouse name: N/A     Number of children: N/A     Years of education: N/A     Occupational History     Not on file.     Social History Main Topics     Smoking status: Never Smoker     Smokeless tobacco: Never Used     Alcohol use No     Drug use: Not on file     Sexual activity: Not on file     Other Topics Concern     Not on file     Social History Narrative     No narrative on file     Current Outpatient Prescriptions   Medication Sig Dispense Refill     acetaminophen (TYLENOL) 325 MG tablet Take 650 mg by mouth every 4 (four) hours as needed.       alendronate (FOSAMAX) 70 MG tablet Take 1 tablet by mouth once a week. Every Thu       atorvastatin (LIPITOR) 40 MG tablet Take 80 mg by mouth daily.       bimatoprost (LUMIGAN) 0.01 % Drop Administer 1 drop to both eyes daily.       brimonidine (ALPHAGAN P) 0.1 % Drop Administer 1 drop to both eyes every 8 (eight) hours.       docusate sodium (COLACE) 100 MG capsule Take 100 mg by mouth daily as needed.       dorzolamide-timolol (COSOPT) 22.3-6.8 mg/mL ophthalmic solution Administer 1 drop to both eyes daily.       magnesium oxide 250 mg Tab Take 250 mg by mouth daily.       pantoprazole (PROTONIX) 40 MG tablet Take 40 mg by mouth daily.       potassium chloride (KLOR-CON) 10 MEQ CR tablet Take 10 mEq by mouth daily.       No current facility-administered medications for this visit.      Allergies   Allergen Reactions     Amlodipine Swelling         Review of Systems:    Constitutional: Negative.  Negative for fever, chills, activity change, appetite change and fatigue.   HENT: Negative for congestion and facial swelling.    Eyes: Negative for photophobia, redness and visual disturbance.   Respiratory: Negative for cough and chest tightness.     Cardiovascular: Negative for chest pain, palpitations and leg swelling.   Gastrointestinal: Negative for nausea, diarrhea, constipation, blood in stool and abdominal distention.   Genitourinary: Negative.    Musculoskeletal: Negative.    Skin: Negative.    Neurological: Negative for dizziness, tremors, syncope, weakness, light-headedness and headaches.   Hematological: Does not bruise/bleed easily.   Psychiatric/Behavioral: Negative.      Vitals:    08/28/17 1309   BP: 126/62   Pulse: 87   Temp: 98  F (36.7  C)       Physical Exam:    GENERAL: no acute distress. Cooperative in conversation.   HEENT: pupils are equal, round and reactive. Oral mucosa is moist and intact.  RESP:Chest symmetric. Regular respiratory rate. No stridor.  CVS: S1S2  ABD: Nondistended, soft.  EXTREMITIES: No lower extremity edema.   NEURO: non focal. Alert and oriented x3.   PSYCH: within normal limits. No depression or anxiety.  SKIN: warm dry intact       Labs:                    Back to top of Results      BASIC METABOLIC PANEL (08/09/2017 7:48 AM)  Only the most recent of 3 results within the time period is included.    BASIC METABOLIC PANEL (08/09/2017 7:48 AM)   Component Value Ref Range   SODIUM 144 135 - 145 mmol/L   POTASSIUM 3.8 3.5 - 5.0 mmol/L   CHLORIDE 118 (H) 98 - 110 mmol/L   CO2,TOTAL 17 (L) 21 - 31 mmol/L   ANION GAP 9 5 - 18    GLUCOSE 141 (H) 65 - 100 mg/dL   CALCIUM 8.2 (L) 8.5 - 10.5 mg/dL   BUN 26 (H) 8 - 25 mg/dL   CREATININE 1.28 (H) 0.57 - 1.11 mg/dL   BUN/CREAT RATIO  20 10 - 20    GFR if  48 (L) >60 ml/min/1.73m2   GFR if not African American 40 (L) >60 ml/min/1.73m2                Back to top of Results      Back to top of Results        Back to top of Results      CBC (08/07/2017 8:19 PM)  CBC (08/07/2017 8:19 PM)   Component Value Ref Range   WHITE BLOOD COUNT  10.4 4.5 - 11.0 thou/cu mm   RED BLOOD COUNT  3.82 (L) 4.00 - 5.20 mil/cu mm   HEMOGLOBIN  11.8 (L) 12.0 - 16.0 g/dL   HEMATOCRIT  35.9  33.0 - 51.0 %   MCV  94 80 - 100 fL   MCH  30.9 26.0 - 34.0 pg   MCHC  32.9 32.0 - 36.0 g/dL   RDW  12.6 11.5 - 15.5 %   PLATELET COUNT  241 140 - 440 thou/cu mm   MPV  10.6 6.5 - 11.0 fL     CBC (08/07/2017 8:19 PM)   Specimen Performing Laboratory   Blood [facility]    SENDOUT INTERNAL ZIP [id number]    [address] [place] [address]     Back to top of Results  Recent Results (from the past 240 hour(s))   Basic Metabolic Panel   Result Value Ref Range    Sodium 142 136 - 145 mmol/L    Potassium 3.4 (L) 3.5 - 5.0 mmol/L    Chloride 105 98 - 107 mmol/L    CO2 25 22 - 31 mmol/L    Anion Gap, Calculation 12 5 - 18 mmol/L    Glucose 136 (H) 70 - 125 mg/dL    Calcium 9.1 8.5 - 10.5 mg/dL    BUN 17 8 - 28 mg/dL    Creatinine 0.97 0.60 - 1.10 mg/dL    GFR MDRD Af Amer >60 >60 mL/min/1.73m2    GFR MDRD Non Af Amer 55 (L) >60 mL/min/1.73m2   Magnesium   Result Value Ref Range    Magnesium 1.7 (L) 1.8 - 2.6 mg/dL   Prealbumin   Result Value Ref Range    Prealbumin 33.8 19.0 - 38.0 mg/dL   HM2(CBC w/o Differential)   Result Value Ref Range    WBC 9.7 4.0 - 11.0 thou/uL    RBC 3.28 (L) 3.80 - 5.40 mill/uL    Hemoglobin 10.3 (L) 12.0 - 16.0 g/dL    Hematocrit 32.3 (L) 35.0 - 47.0 %    MCV 99 80 - 100 fL    MCH 31.4 27.0 - 34.0 pg    MCHC 31.9 (L) 32.0 - 36.0 g/dL    RDW 13.2 11.0 - 14.5 %    Platelets 229 140 - 440 thou/uL    MPV 10.2 8.5 - 12.5 fL       Assessment/Plan:    Acute renal insufficiency felt to be secondary to dehydration with improvement in creatinine post fluid administration  IMPROVED RENAL FUNCTION ON R/C  Hypotension with hypertension taken off the losartan and HCTZ blood pressures so far have been low continue to monitor clinically  NOT ON ANY ANTIHYPERTENSIVE.  Cerebellar dysfunction with resultant loss of balance with frequent falls-WORKING IN PT  Failure to thrive with poor self-cares family is looking at alternative placement decisions for her  GERD-she is on Protonix  daily  Osteoporosis continue with her Fosamax  Hyperlipidemia continue with her Lipitor  Debilitation with history of frequent falls she is here for PT OT and rehab plan is to discharge to an alternative facility upon discharge-family working in assisted living upon discharge  FACE to face discussion regarding need for a wheelchair done today  Patient has mobility limitations significantly impairing their ability to participate in mobility related activities of daily living such as toileting feeding dressing grooming and bathing in customary locations in the home  this mobility limitation cannot be sufficiently and safely resolved by use of an appropriately fitted cane or walker  patient is able to safely use a manual wheelchair at this time and the use of the wheelchair allows them to meet her functional mobility deficit  The beneficiary has expressed a willingness to use a manual wheelchair at [facility] and has sufficient upper extremity function and other physical and mental capabilities needed to safely self-propelled a manual wheelchair in the home during a typical day  Plans to go to an assisted living facility upon discharge  Total time spent was 35 minutes, more than half of it was in face-to-face counseling regarding disease state, treatment, side effects, documentation, review of clinical data and coordination of care  Electronically signed by: RHINA Montano  This progress note was completed using Dragon software and there may be grammatical errors.

## 2021-06-13 NOTE — PROGRESS NOTES
[facility]    Facility:   [facility] [place] [facility] [[id number]]   Code Status: FULL CODE  PCP: Kiki Gomez MD   Phone: [phone]   Fax: [phone]      CHIEF COMPLAINT/REASON FOR VISIT:  Chief Complaint   Patient presents with     Review Of Multiple Medical Conditions       HISTORY COURSE:  Kayla Tanner is a 82 y.o. female who was admitted to the TCU as a transfer from [facility].  This is a 82-year-old with history of some cerebellar disorder along with hypertension and hyperlipidemia who was living in her own home in [place].  She has not been doing very well with due to the underlying history of balance issues she falls frequently there.  She was admitted to the hospital with increasing dizziness and was also noted to have renal insufficiency with an elevated creatinine of 2.06 with a baseline of 1.5.  She was felt to be dehydrated and given IV fluids with improvement in her kidney function.    Recheck creatinine actually is normal at 0.9.  She did have some electrolyte imbalances which were corrected.  She was also noted to be hypotensive and her losartan and chlorthalidone was discontinued and her blood pressures did rebound it was felt that due to declining oral intake poor self-cares her blood pressures were low hence she was becoming more symptomatic and it was recommended that she look for an alternative arrangement.  Her blood pressures are rebounding somewhat and she remains concerned about not being on any antihypertensives I reviewed her blood pressures most of them are less than 160 systolic.  She is moving to an assisted living facility and is looking forward to that.  Unfortunately she did not progress very well in therapy and remains wheelchair-bound ambulating at best 30-40 feet.  She remains a high fall risk  Slums 14/20 safety questionnaire 15/30 as well as her ADM 4.5/6 indicated some degree of cognitive impairment also    Review of  Systems  Constitutional: Negative.  Negative for fever, chills, activity change, appetite change and fatigue.   HENT: Negative for congestion and facial swelling.    Eyes: Negative for photophobia, redness and visual disturbance.   Respiratory: Negative for cough and chest tightness.    Cardiovascular: Negative for chest pain, palpitations and leg swelling.   Gastrointestinal: Negative for nausea, diarrhea, constipation, blood in stool and abdominal distention.   Genitourinary: Negative.    Musculoskeletal: Negative.    Skin: Negative.    Neurological: Negative for dizziness, tremors, syncope, weakness, light-headedness and headaches.   Hematological: Does not bruise/bleed easily.   Psychiatric/Behavioral: Negative.    Vitals:    09/20/17 1141   BP: 150/74   Pulse: 83   Temp: 98  F (36.7  C)   Weight: 125 lb (56.7 kg)       Physical Exam  GENERAL: no acute distress. Cooperative in conversation.   HEENT: pupils are equal, round and reactive. Oral mucosa is moist and intact.  RESP:Chest symmetric. Regular respiratory rate. No stridor.  CVS: S1S2  ABD: Nondistended, soft.  EXTREMITIES: No lower extremity edema.   NEURO: non focal. Alert and oriented x3.   PSYCH: within normal limits. No depression or anxiety.  SKIN: warm dry intact   MEDICATION LIST:  Updated Medication list, printed and signed at discharge, please refer to that final medication list from the Skilled Nursing Facility for accuracy.    Current Outpatient Prescriptions   Medication Sig     acetaminophen (TYLENOL) 325 MG tablet Take 650 mg by mouth every 4 (four) hours as needed.     alendronate (FOSAMAX) 70 MG tablet Take 1 tablet by mouth once a week. Every Thu     atorvastatin (LIPITOR) 40 MG tablet Take 80 mg by mouth daily.     bimatoprost (LUMIGAN) 0.01 % Drop Administer 1 drop to both eyes daily.     brimonidine (ALPHAGAN P) 0.1 % Drop Administer 1 drop to both eyes every 8 (eight) hours.     docusate sodium (COLACE) 100 MG capsule Take 100 mg by  mouth daily as needed.     dorzolamide-timolol (COSOPT) 22.3-6.8 mg/mL ophthalmic solution Administer 1 drop to both eyes daily.     magnesium oxide 250 mg Tab Take 250 mg by mouth daily.     pantoprazole (PROTONIX) 40 MG tablet Take 40 mg by mouth daily.     potassium chloride (KLOR-CON) 10 MEQ CR tablet Take 10 mEq by mouth daily.     Results for orders placed or performed in visit on 08/29/17   Basic Metabolic Panel   Result Value Ref Range    Sodium 142 136 - 145 mmol/L    Potassium 4.2 3.5 - 5.0 mmol/L    Chloride 106 98 - 107 mmol/L    CO2 27 22 - 31 mmol/L    Anion Gap, Calculation 9 5 - 18 mmol/L    Glucose 160 (H) 70 - 125 mg/dL    Calcium 9.8 8.5 - 10.5 mg/dL    BUN 16 8 - 28 mg/dL    Creatinine 0.93 0.60 - 1.10 mg/dL    GFR MDRD Af Amer >60 >60 mL/min/1.73m2    GFR MDRD Non Af Amer 58 (L) >60 mL/min/1.73m2         DISCHARGE DIAGNOSIS:    ICD-10-CM    1. Hypotension I95.9    2. FTT (failure to thrive) in adult R62.7    3. Slow transit constipation K59.01    4. Osteoporosis M81.0    5. Magnesium deficiency E61.2    6. Potassium deficiency E87.6    7. Mixed hyperlipidemia E78.2    8. ARF (acute renal failure) N17.9    9. Hyperlipidemia E78.5        MEDICAL EQUIPMENT NEEDS:  WHEELCHAIR    DISCHARGE PLAN/FACE TO FACE:  I certify that services are/were furnished while this patient was under the care of a physician and that a physician or an allowed non-physician practitioner (NPP), had a face-to-face encounter that meets the physician face-to-face encounter requirements. The encounter was in whole, or in part, related to the primary reason for home health. The patient is confined to his/her home and needs intermittent skilled nursing, physical therapy, speech-language pathology, or the continued need for occupational therapy. A plan of care has been established by a physician and is periodically reviewed by a physician.  Date of Face-to-Face Encounter: 9/20/17    I certify that, based on my findings, the  following services are medically necessary home health services:  PT/OT/HHA    My clinical findings support the need for the above skilled services because: (Please write a brief narrative summary that describes what the RN, PT, SLP, or other services will be doing in the home. A list of diagnoses in this section does not meet the CMS requirements.)  WEAKNESS;COGNITIVE LOSS    This patient is homebound because: (Please write a brief narrative summary describing the functional limitations as to why this patient is homebound and specifically what makes this patient homebound.) MOVING TO [facility]    The patient is, or has been, under my care and I have initiated the establishment of the plan of care. This patient will be followed by a physician who will periodically review the plan of care.  Total time spent was 35 minutes, more than half of it was in face-to-face counseling regarding disease state, treatment, side effects, documentation, review of clinical data and coordination of care    Electronically signed by: RHINA Montano

## 2021-06-16 PROBLEM — E61.2 MAGNESIUM DEFICIENCY: Status: ACTIVE | Noted: 2017-08-22

## 2021-06-16 PROBLEM — E78.2 MIXED HYPERLIPIDEMIA: Status: ACTIVE | Noted: 2017-08-22

## 2021-06-16 PROBLEM — E87.6 POTASSIUM DEFICIENCY: Status: ACTIVE | Noted: 2017-08-22

## 2021-06-16 PROBLEM — K59.01 SLOW TRANSIT CONSTIPATION: Status: ACTIVE | Noted: 2017-09-11

## 2021-06-16 PROBLEM — R62.7 FTT (FAILURE TO THRIVE) IN ADULT: Status: ACTIVE | Noted: 2017-08-22

## 2021-06-16 PROBLEM — M81.0 OSTEOPOROSIS: Status: ACTIVE | Noted: 2017-08-22

## 2021-06-16 PROBLEM — I95.9 HYPOTENSION: Status: ACTIVE | Noted: 2017-08-22

## 2021-06-25 NOTE — PROGRESS NOTES
Progress Notes by Pedro Damon NP at 9/11/2017 11:10 AM     Author: Pedro Damon NP Service: -- Author Type: Nurse Practitioner    Filed: 9/13/2017  9:22 AM Encounter Date: 9/11/2017 Status: Attested Addendum    : Pedro Damon NP (Nurse Practitioner)    Related Notes: Original Note by Pedro Damon NP (Nurse Practitioner) filed at 9/13/2017  9:15 AM    Cosigner: Deanna Schmidt MBBS at 9/13/2017  3:26 PM    Attestation signed by Deanna Schmidt MBBS at 9/13/2017  3:26 PM    Agree with [facility]      Facility:    [facility] [[id number]]  Code Status: FULL CODE      Chief Complaint/Reason for Visit:  Chief Complaint   Patient presents with   ? Review Of Multiple Medical Conditions  Discharge summary       HPI:   Kayla is a 82 y.o. female who is currently admitted to the TCU as a transfer from [facility]. She has a past medical history of a cerebellar disorder, hypertension, hyperlipidemia, and osteoporosis who lives by herself in her own home in [place].  Apparently she had been not been doing well due to an underlying history of balance issues.  She had frequent falls as well.  She was admitted to the hospital with increased dizziness and was also noted to have renal insufficiency with a creatinine of 2.06 with a baseline of 1.5.  She is felt to be dehydrated and fluid resuscitated, which improved her kidney function.  She was also noted to be hypotensive so her losartan and her chlorthalidone were discontinued.  It was felt that her declining oral intake and poor self-care ability contributed to her low blood pressures hence she will need alternative arrangements for long-term housing.  Family is currently looking for placement preferably in assisted living in the meantime she will be participating in therapy to maintain strength and cognitive testing.    TCU:  Today I find an 82-year-old female lying  in bed with limited complaints.  She denied any issues, is participating in therapy, and just waiting for long-term placement.  As well no issues today.  Potassium and magnesium stable after supplementation.  She does state she has periodic issues with constipation though does not want any additional therapy.  Requested wheelchair previously (8/28). BIMS 15/15.    Patient denies pain, headache, chest pain, numbness or tingling, shortest of breath, eating or swallowing concerns, nausea or vomiting, diarrhea or bowel abnormalities, or no new integumentary concerns today.    Past Medical History:  Past Medical History:   Diagnosis Date   ? GARLAND (acute kidney injury)    ? Cerebellar dysfunction    ? Esophageal reflux    ? Glaucoma    ? HLD (hyperlipidemia)    ? HTN (hypertension)    ? Osteoporosis            Surgical History:  Past Surgical History:   Procedure Laterality Date   ? CATARACT EXTRACTION Right 02/11/2013   ? EYE SURGERY Bilateral     laser surgery of eye; right 5/15/12, left 9/12/12   ? TONSILLECTOMY     ? VAGINAL DELIVERY      x2       Family History:   Family History   Problem Relation Age of Onset   ? Heart disease Mother      MI, CABG   ? Hypertension Mother    ? Arthritis Mother    ? Heart disease Father      pacemaker   ? Cancer Father    ? Hypertension Sister    ? Prostate cancer Brother    ? Breast cancer Sister    ? Hypertension Brother        Social History:    Social History     Social History   ? Marital status:      Spouse name: N/A   ? Number of children: N/A   ? Years of education: N/A     Social History Main Topics   ? Smoking status: Never Smoker   ? Smokeless tobacco: Never Used   ? Alcohol use No   ? Drug use: Not on file   ? Sexual activity: Not on file     Other Topics Concern   ? Not on file     Social History Narrative   ? No narrative on file          Review of Systems   Constitutional: Negative for activity change, appetite change and fatigue.        No acute concerns   HENT:  Negative for congestion and facial swelling.    Eyes: Negative for photophobia and visual disturbance.   Respiratory: Negative for apnea, shortness of breath and wheezing.    Cardiovascular: Negative for chest pain.   Gastrointestinal: Negative for abdominal distention, abdominal pain, blood in stool and constipation.   Endocrine: Negative.    Genitourinary: Negative for dysuria and frequency.   Skin: Negative.    Allergic/Immunologic: Negative.    Neurological: Positive for dizziness. Negative for tremors, speech difficulty, weakness and headaches.        Chronic vertigo   Hematological: Negative.    Psychiatric/Behavioral: Negative for agitation and confusion.       Vitals:    09/11/17 1111   BP: 151/67   Pulse: 76   Resp: 16   Temp: 97.5  F (36.4  C)   SpO2: 94%   Weight: 125 lb 9.6 oz (57 kg)       Physical Exam   Constitutional: She is oriented to person, place, and time. No distress.   No acute concerns   HENT:   Head: Normocephalic and atraumatic.   Mouth/Throat: Oropharynx is clear and moist.   Eyes: Right eye exhibits no discharge. Left eye exhibits no discharge.   Neck: Normal range of motion.   Cardiovascular: Normal rate and regular rhythm.  Exam reveals no gallop and no friction rub.    No murmur heard.  Pulmonary/Chest: Effort normal and breath sounds normal. She has no rales.   clear   Abdominal: Soft. Bowel sounds are normal. She exhibits no distension. There is no tenderness.   Genitourinary:   Genitourinary Comments: deferred   Musculoskeletal: She exhibits no edema, tenderness or deformity.   Neurological: She is oriented to person, place, and time.   Chronic vertigo, dizziness   Skin: Skin is warm and dry. She is not diaphoretic.   intact   Psychiatric: She has a normal mood and affect.   No depression or anxiety, flat affect       Medication List:  Current Outpatient Prescriptions   Medication Sig   ? acetaminophen (TYLENOL) 325 MG tablet Take 650 mg by mouth every 4 (four) hours as needed.   ?  alendronate (FOSAMAX) 70 MG tablet Take 1 tablet by mouth once a week. Every Thu   ? atorvastatin (LIPITOR) 40 MG tablet Take 80 mg by mouth daily.   ? bimatoprost (LUMIGAN) 0.01 % Drop Administer 1 drop to both eyes daily.   ? brimonidine (ALPHAGAN P) 0.1 % Drop Administer 1 drop to both eyes every 8 (eight) hours.   ? docusate sodium (COLACE) 100 MG capsule Take 100 mg by mouth daily as needed.   ? dorzolamide-timolol (COSOPT) 22.3-6.8 mg/mL ophthalmic solution Administer 1 drop to both eyes daily.   ? magnesium oxide 250 mg Tab Take 250 mg by mouth daily.   ? pantoprazole (PROTONIX) 40 MG tablet Take 40 mg by mouth daily.   ? potassium chloride (KLOR-CON) 10 MEQ CR tablet Take 10 mEq by mouth daily.       Labs:  No results found for this or any previous visit (from the past 168 hour(s)).    Discharge diagnoses:  Acute renal insufficiency felt to be secondary to dehydration with improvement in creatinine post fluid administration  Hypotension with hypertension taken off the losartan and HCTZ blood pressures. So far have been low continue to monitor clinically. No antihypertensive.   Cerebellar dysfunction with resultant loss of balance with frequent falls  Failure to thrive with poor self-cares family is looking at alternative placement decisions for her  GERD-she is on Protonix daily, no issue  Osteoporosis continue with her Fosamax  Hyperlipidemia continue with her Lipitor    MEDICAL EQUIPMENT NEEDS:  wheelchair    DISCHARGE PLAN/FACE TO FACE:  I certify that services are/were furnished while this patient was under the care of a physician and that a physician or an allowed non-physician practitioner (NPP), had a face-to-face encounter that meets the physician face-to-face encounter requirements. The encounter was in whole, or in part, related to the primary reason for home health. The patient is confined to his/her home and needs intermittent skilled nursing, physical therapy, speech-language pathology, or the  continued need for occupational therapy. A plan of care has been established by a physician and is periodically reviewed by a physician.  Date of Face-to-Face Encounter: 9/12/17    I certify that, based on my findings, the following services are medically necessary home health services: [facility] PT/OT to evaluate and treat with [facility]    My clinical findings support the need for the above skilled services because: she may need add assistance in performing IADLs and ADLs more effectively.    The care plan has been reviewed and all orders signed. Changes to care plan, if any, as noted. Otherwise, continue care plan of care.      Electronically signed by: Pedro Damon NP

## 2021-11-02 ENCOUNTER — LAB REQUISITION (OUTPATIENT)
Dept: LAB | Facility: CLINIC | Age: 86
End: 2021-11-02
Payer: COMMERCIAL

## 2021-11-03 LAB
ANION GAP SERPL CALCULATED.3IONS-SCNC: 7 MMOL/L (ref 5–18)
BUN SERPL-MCNC: 32 MG/DL (ref 8–28)
CALCIUM SERPL-MCNC: 9.8 MG/DL (ref 8.5–10.5)
CHLORIDE BLD-SCNC: 110 MMOL/L (ref 98–107)
CO2 SERPL-SCNC: 25 MMOL/L (ref 22–31)
CREAT SERPL-MCNC: 1.33 MG/DL (ref 0.6–1.1)
GFR SERPL CREATININE-BSD FRML MDRD: 36 ML/MIN/1.73M2
GLUCOSE BLD-MCNC: 172 MG/DL (ref 70–125)
POTASSIUM BLD-SCNC: 5.1 MMOL/L (ref 3.5–5)
SODIUM SERPL-SCNC: 142 MMOL/L (ref 136–145)

## 2021-11-03 PROCEDURE — 80048 BASIC METABOLIC PNL TOTAL CA: CPT | Mod: ORL | Performed by: FAMILY MEDICINE

## 2021-11-03 PROCEDURE — 36415 COLL VENOUS BLD VENIPUNCTURE: CPT | Mod: ORL | Performed by: FAMILY MEDICINE

## 2021-11-03 PROCEDURE — P9603 ONE-WAY ALLOW PRORATED MILES: HCPCS | Mod: ORL | Performed by: FAMILY MEDICINE

## 2022-01-11 ENCOUNTER — LAB REQUISITION (OUTPATIENT)
Dept: LAB | Facility: CLINIC | Age: 87
End: 2022-01-11
Payer: COMMERCIAL

## 2022-01-11 DIAGNOSIS — N17.9 ACUTE KIDNEY FAILURE, UNSPECIFIED (H): ICD-10-CM

## 2022-01-11 DIAGNOSIS — I10 ESSENTIAL (PRIMARY) HYPERTENSION: ICD-10-CM

## 2022-01-12 LAB
ANION GAP SERPL CALCULATED.3IONS-SCNC: 14 MMOL/L (ref 5–18)
BUN SERPL-MCNC: 61 MG/DL (ref 8–28)
CALCIUM SERPL-MCNC: 9.5 MG/DL (ref 8.5–10.5)
CHLORIDE BLD-SCNC: 111 MMOL/L (ref 98–107)
CO2 SERPL-SCNC: 15 MMOL/L (ref 22–31)
CREAT SERPL-MCNC: 3.88 MG/DL (ref 0.6–1.1)
GFR SERPL CREATININE-BSD FRML MDRD: 11 ML/MIN/1.73M2
GLUCOSE BLD-MCNC: 77 MG/DL (ref 70–125)
MAGNESIUM SERPL-MCNC: 2.1 MG/DL (ref 1.8–2.6)
POTASSIUM BLD-SCNC: 4.9 MMOL/L (ref 3.5–5)
SODIUM SERPL-SCNC: 140 MMOL/L (ref 136–145)

## 2022-01-12 PROCEDURE — 80048 BASIC METABOLIC PNL TOTAL CA: CPT | Mod: ORL | Performed by: FAMILY MEDICINE

## 2022-01-12 PROCEDURE — P9604 ONE-WAY ALLOW PRORATED TRIP: HCPCS | Mod: ORL | Performed by: FAMILY MEDICINE

## 2022-01-12 PROCEDURE — 36415 COLL VENOUS BLD VENIPUNCTURE: CPT | Mod: ORL | Performed by: FAMILY MEDICINE

## 2022-01-12 PROCEDURE — 83735 ASSAY OF MAGNESIUM: CPT | Mod: ORL | Performed by: FAMILY MEDICINE

## 2022-12-12 ENCOUNTER — LAB REQUISITION (OUTPATIENT)
Dept: LAB | Facility: CLINIC | Age: 87
End: 2022-12-12
Payer: COMMERCIAL

## 2022-12-12 DIAGNOSIS — E78.41 ELEVATED LIPOPROTEIN(A): ICD-10-CM

## 2022-12-14 LAB
ANION GAP SERPL CALCULATED.3IONS-SCNC: 11 MMOL/L (ref 7–15)
BUN SERPL-MCNC: 47.4 MG/DL (ref 8–23)
CALCIUM SERPL-MCNC: 9.6 MG/DL (ref 8.8–10.2)
CHLORIDE SERPL-SCNC: 107 MMOL/L (ref 98–107)
CREAT SERPL-MCNC: 1.42 MG/DL (ref 0.51–0.95)
DEPRECATED HCO3 PLAS-SCNC: 24 MMOL/L (ref 22–29)
GFR SERPL CREATININE-BSD FRML MDRD: 36 ML/MIN/1.73M2
GLUCOSE SERPL-MCNC: 108 MG/DL (ref 70–99)
POTASSIUM SERPL-SCNC: 5.1 MMOL/L (ref 3.4–5.3)
SODIUM SERPL-SCNC: 142 MMOL/L (ref 136–145)

## 2022-12-14 PROCEDURE — 36415 COLL VENOUS BLD VENIPUNCTURE: CPT | Mod: ORL | Performed by: FAMILY MEDICINE

## 2022-12-14 PROCEDURE — 80048 BASIC METABOLIC PNL TOTAL CA: CPT | Mod: ORL | Performed by: FAMILY MEDICINE

## 2022-12-14 PROCEDURE — P9603 ONE-WAY ALLOW PRORATED MILES: HCPCS | Mod: ORL | Performed by: FAMILY MEDICINE

## 2023-11-16 ENCOUNTER — LAB REQUISITION (OUTPATIENT)
Dept: LAB | Facility: CLINIC | Age: 88
End: 2023-11-16
Payer: COMMERCIAL

## 2023-11-16 DIAGNOSIS — N18.9 CHRONIC KIDNEY DISEASE, UNSPECIFIED: ICD-10-CM

## 2023-11-17 LAB
ANION GAP SERPL CALCULATED.3IONS-SCNC: 10 MMOL/L (ref 7–15)
BUN SERPL-MCNC: 43 MG/DL (ref 8–23)
CALCIUM SERPL-MCNC: 9.8 MG/DL (ref 8.8–10.2)
CHLORIDE SERPL-SCNC: 108 MMOL/L (ref 98–107)
CREAT SERPL-MCNC: 1.53 MG/DL (ref 0.51–0.95)
DEPRECATED HCO3 PLAS-SCNC: 25 MMOL/L (ref 22–29)
EGFRCR SERPLBLD CKD-EPI 2021: 32 ML/MIN/1.73M2
GLUCOSE SERPL-MCNC: 181 MG/DL (ref 70–99)
POTASSIUM SERPL-SCNC: 5 MMOL/L (ref 3.4–5.3)
SODIUM SERPL-SCNC: 143 MMOL/L (ref 135–145)

## 2023-11-17 PROCEDURE — 36415 COLL VENOUS BLD VENIPUNCTURE: CPT | Mod: ORL | Performed by: FAMILY MEDICINE

## 2023-11-17 PROCEDURE — P9604 ONE-WAY ALLOW PRORATED TRIP: HCPCS | Mod: ORL | Performed by: FAMILY MEDICINE

## 2023-11-17 PROCEDURE — 80048 BASIC METABOLIC PNL TOTAL CA: CPT | Mod: ORL | Performed by: FAMILY MEDICINE

## 2024-02-09 ENCOUNTER — LAB REQUISITION (OUTPATIENT)
Dept: LAB | Facility: CLINIC | Age: 89
End: 2024-02-09
Payer: COMMERCIAL

## 2024-02-09 DIAGNOSIS — R79.89 OTHER SPECIFIED ABNORMAL FINDINGS OF BLOOD CHEMISTRY: ICD-10-CM

## 2024-02-12 LAB
ANION GAP SERPL CALCULATED.3IONS-SCNC: 13 MMOL/L (ref 7–15)
BUN SERPL-MCNC: 29.6 MG/DL (ref 8–23)
CALCIUM SERPL-MCNC: 9.7 MG/DL (ref 8.2–9.6)
CHLORIDE SERPL-SCNC: 106 MMOL/L (ref 98–107)
CREAT SERPL-MCNC: 1.25 MG/DL (ref 0.51–0.95)
DEPRECATED HCO3 PLAS-SCNC: 21 MMOL/L (ref 22–29)
EGFRCR SERPLBLD CKD-EPI 2021: 38 ML/MIN/1.73M2
GLUCOSE SERPL-MCNC: 117 MG/DL (ref 70–99)
POTASSIUM SERPL-SCNC: 4.7 MMOL/L (ref 3.4–5.3)
SODIUM SERPL-SCNC: 140 MMOL/L (ref 135–145)

## 2024-02-12 PROCEDURE — P9603 ONE-WAY ALLOW PRORATED MILES: HCPCS | Mod: ORL | Performed by: FAMILY MEDICINE

## 2024-02-12 PROCEDURE — 36415 COLL VENOUS BLD VENIPUNCTURE: CPT | Mod: ORL | Performed by: FAMILY MEDICINE

## 2024-02-12 PROCEDURE — 80048 BASIC METABOLIC PNL TOTAL CA: CPT | Mod: ORL | Performed by: FAMILY MEDICINE

## 2025-05-01 ENCOUNTER — LAB REQUISITION (OUTPATIENT)
Dept: LAB | Facility: CLINIC | Age: OVER 89
End: 2025-05-01
Payer: COMMERCIAL

## 2025-05-01 DIAGNOSIS — R41.89 OTHER SYMPTOMS AND SIGNS INVOLVING COGNITIVE FUNCTIONS AND AWARENESS: ICD-10-CM

## 2025-05-01 DIAGNOSIS — E78.5 HYPERLIPIDEMIA, UNSPECIFIED: ICD-10-CM

## 2025-05-01 DIAGNOSIS — I10 ESSENTIAL (PRIMARY) HYPERTENSION: ICD-10-CM

## 2025-05-05 LAB
ALBUMIN SERPL BCG-MCNC: 4.3 G/DL (ref 3.5–5.2)
ALP SERPL-CCNC: 61 U/L (ref 40–150)
ALT SERPL W P-5'-P-CCNC: 10 U/L (ref 0–50)
ANION GAP SERPL CALCULATED.3IONS-SCNC: 12 MMOL/L (ref 7–15)
AST SERPL W P-5'-P-CCNC: 16 U/L (ref 0–45)
BILIRUB SERPL-MCNC: 0.2 MG/DL
BUN SERPL-MCNC: 49.2 MG/DL (ref 8–23)
CALCIUM SERPL-MCNC: 10.1 MG/DL (ref 8.8–10.4)
CHLORIDE SERPL-SCNC: 108 MMOL/L (ref 98–107)
CHOLEST SERPL-MCNC: 161 MG/DL
CREAT SERPL-MCNC: 2.16 MG/DL (ref 0.51–0.95)
EGFRCR SERPLBLD CKD-EPI 2021: 21 ML/MIN/1.73M2
ERYTHROCYTE [DISTWIDTH] IN BLOOD BY AUTOMATED COUNT: 13 % (ref 10–15)
FASTING STATUS PATIENT QL REPORTED: NORMAL
GLUCOSE SERPL-MCNC: 118 MG/DL (ref 70–99)
HCO3 SERPL-SCNC: 24 MMOL/L (ref 22–29)
HCT VFR BLD AUTO: 33.3 % (ref 35–47)
HDLC SERPL-MCNC: 61 MG/DL
HGB BLD-MCNC: 10.4 G/DL (ref 11.7–15.7)
LDLC SERPL CALC-MCNC: 72 MG/DL
MCH RBC QN AUTO: 31.8 PG (ref 26.5–33)
MCHC RBC AUTO-ENTMCNC: 31.2 G/DL (ref 31.5–36.5)
MCV RBC AUTO: 102 FL (ref 78–100)
NONHDLC SERPL-MCNC: 100 MG/DL
PLATELET # BLD AUTO: 194 10E3/UL (ref 150–450)
POTASSIUM SERPL-SCNC: 4.6 MMOL/L (ref 3.4–5.3)
PROT SERPL-MCNC: 6.8 G/DL (ref 6.4–8.3)
RBC # BLD AUTO: 3.27 10E6/UL (ref 3.8–5.2)
SODIUM SERPL-SCNC: 144 MMOL/L (ref 135–145)
TRIGL SERPL-MCNC: 139 MG/DL
TSH SERPL DL<=0.005 MIU/L-ACNC: 0.8 UIU/ML (ref 0.3–4.2)
VIT B12 SERPL-MCNC: 807 PG/ML (ref 232–1245)
WBC # BLD AUTO: 6.3 10E3/UL (ref 4–11)

## 2025-05-05 PROCEDURE — 82607 VITAMIN B-12: CPT | Mod: ORL | Performed by: PHYSICIAN ASSISTANT

## 2025-05-05 PROCEDURE — 80053 COMPREHEN METABOLIC PANEL: CPT | Mod: ORL | Performed by: PHYSICIAN ASSISTANT

## 2025-05-05 PROCEDURE — P9603 ONE-WAY ALLOW PRORATED MILES: HCPCS | Mod: ORL | Performed by: PHYSICIAN ASSISTANT

## 2025-05-05 PROCEDURE — 36415 COLL VENOUS BLD VENIPUNCTURE: CPT | Mod: ORL | Performed by: PHYSICIAN ASSISTANT

## 2025-05-05 PROCEDURE — 84443 ASSAY THYROID STIM HORMONE: CPT | Mod: ORL | Performed by: PHYSICIAN ASSISTANT

## 2025-05-05 PROCEDURE — 80061 LIPID PANEL: CPT | Mod: ORL | Performed by: PHYSICIAN ASSISTANT

## 2025-05-05 PROCEDURE — 85027 COMPLETE CBC AUTOMATED: CPT | Mod: ORL | Performed by: PHYSICIAN ASSISTANT

## 2025-06-17 ENCOUNTER — LAB REQUISITION (OUTPATIENT)
Dept: LAB | Facility: CLINIC | Age: OVER 89
End: 2025-06-17
Payer: COMMERCIAL

## 2025-06-17 DIAGNOSIS — N18.4 CHRONIC KIDNEY DISEASE, STAGE 4 (SEVERE) (H): ICD-10-CM

## 2025-06-23 LAB
ANION GAP SERPL CALCULATED.3IONS-SCNC: 14 MMOL/L (ref 7–15)
BUN SERPL-MCNC: 43.5 MG/DL (ref 8–23)
CALCIUM SERPL-MCNC: 9.7 MG/DL (ref 8.8–10.4)
CHLORIDE SERPL-SCNC: 107 MMOL/L (ref 98–107)
CREAT SERPL-MCNC: 1.98 MG/DL (ref 0.51–0.95)
EGFRCR SERPLBLD CKD-EPI 2021: 24 ML/MIN/1.73M2
GLUCOSE SERPL-MCNC: 82 MG/DL (ref 70–99)
HCO3 SERPL-SCNC: 21 MMOL/L (ref 22–29)
POTASSIUM SERPL-SCNC: 4.6 MMOL/L (ref 3.4–5.3)
SODIUM SERPL-SCNC: 142 MMOL/L (ref 135–145)

## 2025-06-23 PROCEDURE — P9604 ONE-WAY ALLOW PRORATED TRIP: HCPCS | Mod: ORL | Performed by: PHYSICIAN ASSISTANT

## 2025-06-23 PROCEDURE — 36415 COLL VENOUS BLD VENIPUNCTURE: CPT | Mod: ORL | Performed by: PHYSICIAN ASSISTANT

## 2025-06-23 PROCEDURE — 80048 BASIC METABOLIC PNL TOTAL CA: CPT | Mod: ORL | Performed by: PHYSICIAN ASSISTANT
